# Patient Record
Sex: MALE | Race: WHITE | NOT HISPANIC OR LATINO
[De-identification: names, ages, dates, MRNs, and addresses within clinical notes are randomized per-mention and may not be internally consistent; named-entity substitution may affect disease eponyms.]

---

## 2017-03-30 ENCOUNTER — APPOINTMENT (OUTPATIENT)
Dept: FAMILY MEDICINE | Facility: CLINIC | Age: 67
End: 2017-03-30

## 2017-03-30 VITALS
BODY MASS INDEX: 23.04 KG/M2 | HEIGHT: 72 IN | WEIGHT: 170.13 LBS | SYSTOLIC BLOOD PRESSURE: 130 MMHG | DIASTOLIC BLOOD PRESSURE: 70 MMHG

## 2017-03-30 DIAGNOSIS — Z86.79 PERSONAL HISTORY OF OTHER DISEASES OF THE CIRCULATORY SYSTEM: ICD-10-CM

## 2017-03-30 DIAGNOSIS — Z87.19 PERSONAL HISTORY OF OTHER DISEASES OF THE DIGESTIVE SYSTEM: ICD-10-CM

## 2017-03-30 DIAGNOSIS — Z86.2 PERSONAL HISTORY OF DISEASES OF THE BLOOD AND BLOOD-FORMING ORGANS AND CERTAIN DISORDERS INVOLVING THE IMMUNE MECHANISM: ICD-10-CM

## 2017-03-30 RX ORDER — ASPIRIN 81 MG
81 TABLET, DELAYED RELEASE (ENTERIC COATED) ORAL DAILY
Refills: 0 | Status: ACTIVE | COMMUNITY

## 2017-04-01 LAB
ALBUMIN SERPL ELPH-MCNC: 4.2 G/DL
ALP BLD-CCNC: 54 U/L
ALT SERPL-CCNC: 25 U/L
ANION GAP SERPL CALC-SCNC: 13 MMOL/L
AST SERPL-CCNC: 25 U/L
BILIRUB SERPL-MCNC: 0.7 MG/DL
BUN SERPL-MCNC: 16 MG/DL
CALCIUM SERPL-MCNC: 9.6 MG/DL
CHLORIDE SERPL-SCNC: 102 MMOL/L
CHOLEST SERPL-MCNC: 130 MG/DL
CHOLEST/HDLC SERPL: 2.1 RATIO
CO2 SERPL-SCNC: 24 MMOL/L
CREAT SERPL-MCNC: 0.93 MG/DL
GLUCOSE SERPL-MCNC: 95 MG/DL
HBA1C MFR BLD HPLC: 5.5 %
HDLC SERPL-MCNC: 62 MG/DL
LDLC SERPL CALC-MCNC: 57 MG/DL
POTASSIUM SERPL-SCNC: 5.3 MMOL/L
PROT SERPL-MCNC: 6.8 G/DL
SODIUM SERPL-SCNC: 139 MMOL/L
TRIGL SERPL-MCNC: 53 MG/DL

## 2017-05-02 ENCOUNTER — RX RENEWAL (OUTPATIENT)
Age: 67
End: 2017-05-02

## 2017-05-05 ENCOUNTER — TRANSCRIPTION ENCOUNTER (OUTPATIENT)
Age: 67
End: 2017-05-05

## 2017-05-25 ENCOUNTER — RX RENEWAL (OUTPATIENT)
Age: 67
End: 2017-05-25

## 2017-06-16 ENCOUNTER — RX RENEWAL (OUTPATIENT)
Age: 67
End: 2017-06-16

## 2017-07-25 ENCOUNTER — TRANSCRIPTION ENCOUNTER (OUTPATIENT)
Age: 67
End: 2017-07-25

## 2017-08-10 ENCOUNTER — APPOINTMENT (OUTPATIENT)
Dept: FAMILY MEDICINE | Facility: CLINIC | Age: 67
End: 2017-08-10
Payer: COMMERCIAL

## 2017-08-10 VITALS
DIASTOLIC BLOOD PRESSURE: 62 MMHG | BODY MASS INDEX: 21.26 KG/M2 | HEIGHT: 72 IN | WEIGHT: 157 LBS | SYSTOLIC BLOOD PRESSURE: 104 MMHG

## 2017-08-10 PROCEDURE — 90715 TDAP VACCINE 7 YRS/> IM: CPT

## 2017-08-10 PROCEDURE — 99397 PER PM REEVAL EST PAT 65+ YR: CPT | Mod: 25

## 2017-08-10 PROCEDURE — 90471 IMMUNIZATION ADMIN: CPT

## 2017-11-28 ENCOUNTER — TRANSCRIPTION ENCOUNTER (OUTPATIENT)
Age: 67
End: 2017-11-28

## 2017-12-14 ENCOUNTER — RX RENEWAL (OUTPATIENT)
Age: 67
End: 2017-12-14

## 2017-12-14 ENCOUNTER — TRANSCRIPTION ENCOUNTER (OUTPATIENT)
Age: 67
End: 2017-12-14

## 2018-02-12 ENCOUNTER — APPOINTMENT (OUTPATIENT)
Dept: FAMILY MEDICINE | Facility: CLINIC | Age: 68
End: 2018-02-12
Payer: COMMERCIAL

## 2018-02-12 VITALS
WEIGHT: 154.25 LBS | HEIGHT: 72 IN | BODY MASS INDEX: 20.89 KG/M2 | SYSTOLIC BLOOD PRESSURE: 104 MMHG | HEART RATE: 63 BPM | OXYGEN SATURATION: 99 % | DIASTOLIC BLOOD PRESSURE: 66 MMHG

## 2018-02-12 PROCEDURE — 36415 COLL VENOUS BLD VENIPUNCTURE: CPT

## 2018-02-12 PROCEDURE — 99214 OFFICE O/P EST MOD 30 MIN: CPT | Mod: 25

## 2018-02-13 LAB
ALBUMIN SERPL ELPH-MCNC: 4.5 G/DL
ALP BLD-CCNC: 48 U/L
ALT SERPL-CCNC: 25 U/L
ANION GAP SERPL CALC-SCNC: 13 MMOL/L
AST SERPL-CCNC: 28 U/L
BILIRUB SERPL-MCNC: 0.7 MG/DL
BUN SERPL-MCNC: 17 MG/DL
CALCIUM SERPL-MCNC: 9.9 MG/DL
CHLORIDE SERPL-SCNC: 99 MMOL/L
CHOLEST SERPL-MCNC: 127 MG/DL
CHOLEST/HDLC SERPL: 2.1 RATIO
CO2 SERPL-SCNC: 28 MMOL/L
CREAT SERPL-MCNC: 1.11 MG/DL
GLUCOSE SERPL-MCNC: 92 MG/DL
HBA1C MFR BLD HPLC: 5.4 %
HDLC SERPL-MCNC: 60 MG/DL
LDLC SERPL CALC-MCNC: 55 MG/DL
POTASSIUM SERPL-SCNC: 4.9 MMOL/L
PROT SERPL-MCNC: 7.2 G/DL
SODIUM SERPL-SCNC: 140 MMOL/L
TRIGL SERPL-MCNC: 59 MG/DL

## 2018-05-30 ENCOUNTER — RX RENEWAL (OUTPATIENT)
Age: 68
End: 2018-05-30

## 2018-05-30 ENCOUNTER — TRANSCRIPTION ENCOUNTER (OUTPATIENT)
Age: 68
End: 2018-05-30

## 2018-05-31 ENCOUNTER — TRANSCRIPTION ENCOUNTER (OUTPATIENT)
Age: 68
End: 2018-05-31

## 2018-06-13 ENCOUNTER — TRANSCRIPTION ENCOUNTER (OUTPATIENT)
Age: 68
End: 2018-06-13

## 2018-06-13 ENCOUNTER — RX RENEWAL (OUTPATIENT)
Age: 68
End: 2018-06-13

## 2018-08-29 ENCOUNTER — TRANSCRIPTION ENCOUNTER (OUTPATIENT)
Age: 68
End: 2018-08-29

## 2018-08-29 ENCOUNTER — RX RENEWAL (OUTPATIENT)
Age: 68
End: 2018-08-29

## 2018-09-11 ENCOUNTER — RX RENEWAL (OUTPATIENT)
Age: 68
End: 2018-09-11

## 2018-09-11 ENCOUNTER — TRANSCRIPTION ENCOUNTER (OUTPATIENT)
Age: 68
End: 2018-09-11

## 2018-09-17 ENCOUNTER — APPOINTMENT (OUTPATIENT)
Dept: FAMILY MEDICINE | Facility: CLINIC | Age: 68
End: 2018-09-17
Payer: COMMERCIAL

## 2018-09-17 VITALS
SYSTOLIC BLOOD PRESSURE: 112 MMHG | HEART RATE: 56 BPM | RESPIRATION RATE: 16 BRPM | DIASTOLIC BLOOD PRESSURE: 66 MMHG | HEIGHT: 72 IN | OXYGEN SATURATION: 99 % | TEMPERATURE: 98.5 F | WEIGHT: 151 LBS | BODY MASS INDEX: 20.45 KG/M2

## 2018-09-17 PROCEDURE — 36415 COLL VENOUS BLD VENIPUNCTURE: CPT

## 2018-09-17 PROCEDURE — 99214 OFFICE O/P EST MOD 30 MIN: CPT | Mod: 25

## 2018-09-17 NOTE — HISTORY OF PRESENT ILLNESS
[de-identified] : No further lightheadedness on lower dose of ramipril. No SVT. Has seen cardiology\par \par Having some lower urinary tract symptoms with nocturia and weak stream\par \par Tubular adenoma on colonoscopy 2 years ago told to followup with GI

## 2018-09-17 NOTE — ASSESSMENT
[FreeTextEntry1] : Weak stream, secondary to sphincter hyperplasia. Will give trial of Flomax. Patient warned of hypotension

## 2018-09-17 NOTE — PHYSICAL EXAM
[No Acute Distress] : no acute distress [Well-Appearing] : well-appearing [Regular Rhythm] : with a regular rhythm [No Hernias] : no hernias [Normal Sphincter Tone] : normal sphincter tone [No Mass] : no mass [Penis Abnormality] : normal circumcised penis [Testes Mass (___cm)] : there were no testicular masses [Prostate Enlargement] : the prostate was not enlarged [Prostate Tenderness] : the prostate was not tender [No Prostate Nodules] : no prostate nodules

## 2018-09-18 LAB
ALBUMIN SERPL ELPH-MCNC: 4.6 G/DL
ALP BLD-CCNC: 49 U/L
ALT SERPL-CCNC: 23 U/L
ANION GAP SERPL CALC-SCNC: 12 MMOL/L
AST SERPL-CCNC: 32 U/L
BILIRUB SERPL-MCNC: 0.7 MG/DL
BUN SERPL-MCNC: 16 MG/DL
CALCIUM SERPL-MCNC: 9.4 MG/DL
CHLORIDE SERPL-SCNC: 101 MMOL/L
CHOLEST SERPL-MCNC: 131 MG/DL
CHOLEST/HDLC SERPL: 2 RATIO
CO2 SERPL-SCNC: 26 MMOL/L
CREAT SERPL-MCNC: 0.79 MG/DL
GLUCOSE SERPL-MCNC: 86 MG/DL
HBA1C MFR BLD HPLC: 5.1 %
HDLC SERPL-MCNC: 66 MG/DL
LDLC SERPL CALC-MCNC: 57 MG/DL
POTASSIUM SERPL-SCNC: 4.8 MMOL/L
PROT SERPL-MCNC: 6.9 G/DL
PSA SERPL-MCNC: 1.35 NG/ML
SODIUM SERPL-SCNC: 139 MMOL/L
TRIGL SERPL-MCNC: 41 MG/DL

## 2018-10-30 ENCOUNTER — RX RENEWAL (OUTPATIENT)
Age: 68
End: 2018-10-30

## 2018-10-30 ENCOUNTER — TRANSCRIPTION ENCOUNTER (OUTPATIENT)
Age: 68
End: 2018-10-30

## 2018-12-13 ENCOUNTER — RX CHANGE (OUTPATIENT)
Age: 68
End: 2018-12-13

## 2018-12-13 ENCOUNTER — TRANSCRIPTION ENCOUNTER (OUTPATIENT)
Age: 68
End: 2018-12-13

## 2018-12-18 ENCOUNTER — TRANSCRIPTION ENCOUNTER (OUTPATIENT)
Age: 68
End: 2018-12-18

## 2019-01-29 ENCOUNTER — TRANSCRIPTION ENCOUNTER (OUTPATIENT)
Age: 69
End: 2019-01-29

## 2019-01-29 ENCOUNTER — RX RENEWAL (OUTPATIENT)
Age: 69
End: 2019-01-29

## 2019-02-28 ENCOUNTER — TRANSCRIPTION ENCOUNTER (OUTPATIENT)
Age: 69
End: 2019-02-28

## 2019-02-28 ENCOUNTER — RX RENEWAL (OUTPATIENT)
Age: 69
End: 2019-02-28

## 2019-04-29 ENCOUNTER — TRANSCRIPTION ENCOUNTER (OUTPATIENT)
Age: 69
End: 2019-04-29

## 2019-06-07 ENCOUNTER — TRANSCRIPTION ENCOUNTER (OUTPATIENT)
Age: 69
End: 2019-06-07

## 2019-07-31 ENCOUNTER — TRANSCRIPTION ENCOUNTER (OUTPATIENT)
Age: 69
End: 2019-07-31

## 2019-07-31 ENCOUNTER — RX RENEWAL (OUTPATIENT)
Age: 69
End: 2019-07-31

## 2019-08-27 ENCOUNTER — TRANSCRIPTION ENCOUNTER (OUTPATIENT)
Age: 69
End: 2019-08-27

## 2019-08-27 ENCOUNTER — RX CHANGE (OUTPATIENT)
Age: 69
End: 2019-08-27

## 2019-09-10 ENCOUNTER — TRANSCRIPTION ENCOUNTER (OUTPATIENT)
Age: 69
End: 2019-09-10

## 2019-09-18 ENCOUNTER — APPOINTMENT (OUTPATIENT)
Dept: FAMILY MEDICINE | Facility: CLINIC | Age: 69
End: 2019-09-18
Payer: COMMERCIAL

## 2019-09-18 VITALS
BODY MASS INDEX: 20.88 KG/M2 | RESPIRATION RATE: 16 BRPM | DIASTOLIC BLOOD PRESSURE: 60 MMHG | HEIGHT: 72 IN | SYSTOLIC BLOOD PRESSURE: 116 MMHG | HEART RATE: 58 BPM | WEIGHT: 154.13 LBS | OXYGEN SATURATION: 98 %

## 2019-09-18 LAB
ESTIMATED AVERAGE GLUCOSE: 105 MG/DL
HBA1C MFR BLD HPLC: 5.3 %

## 2019-09-18 PROCEDURE — 99397 PER PM REEVAL EST PAT 65+ YR: CPT | Mod: 25

## 2019-09-18 PROCEDURE — G0444 DEPRESSION SCREEN ANNUAL: CPT

## 2019-09-18 NOTE — END OF VISIT
[FreeTextEntry3] : I, Lowell Martins, acted solely as a scribe for Dr. Travis Lincoln MD on this date 09/18/2019. \par \par All medical records entries made by the Scribe were at my, Dr. Travis Lincoln MD, direction and personally dictated by me on 09/18/2019. I have personally reviewed the chart and agree that the record accurately reflects my personal performance of the history, physical exam, assessment, and plan. I have also personally directed, reviewed, and agreed with the chart.

## 2019-09-18 NOTE — PLAN
[FreeTextEntry1] : \par 1. Continue with medications as outlined above. I renewed his Tamsulosin. \par \par 2. The patient was given a referral to gastroenterology Dr. Ho for reevaluation of his tubular adenoma\par \par 3. The patient was sent for routine blood work. \par \par 4. The new shingles vaccine has been recommended. \par \par 5. Follow up with myself in 6 months for reevaluation.

## 2019-09-18 NOTE — ADDENDUM
[FreeTextEntry1] : I, Lowell Martins, acted solely as a scribe for Dr. Lincoln on this date 09/18/2019.

## 2019-09-18 NOTE — PHYSICAL EXAM
[No Hernias] : no hernias [Penis Abnormality] : normal circumcised penis [Testes Tenderness] : no tenderness of the testes [Testes Mass (___cm)] : there were no testicular masses [Prostate Enlargement] : the prostate was not enlarged [Prostate Tenderness] : the prostate was not tender [Normal] : affect was normal and insight and judgment were intact [No Prostate Nodules] : no prostate nodules

## 2019-09-18 NOTE — HISTORY OF PRESENT ILLNESS
[de-identified] : The patient comes in today for a followup evaluation and reassessment.  \par \par The patient recently saw cardiology and had a negative stress test. His hyperlipidemia and hypertension is well controlled on his current medications. \par \par Overall, the patient states he has been doing fairly well. He reports that he has been taking Flomax with improvement of his urinary symptoms. He endorses increased frequency at night that he attributes to drinking lot of water. He notes inconsistent flow of urine at night but during the day it has been good. He has no new complaints. \par \par He has been brushing his teeth regularly. He has been exercising on a fairly regular basis. No cardiovascular symptoms with exertion. \par \par He is aware that he is due for repeat colonoscopy for tubular adenoma seen in 2016 referred to gastroenterology

## 2019-09-19 LAB
ALBUMIN SERPL ELPH-MCNC: 4.6 G/DL
ALP BLD-CCNC: 52 U/L
ALT SERPL-CCNC: 26 U/L
ANION GAP SERPL CALC-SCNC: 11 MMOL/L
AST SERPL-CCNC: 31 U/L
BILIRUB SERPL-MCNC: 0.6 MG/DL
BUN SERPL-MCNC: 15 MG/DL
CALCIUM SERPL-MCNC: 9.4 MG/DL
CHLORIDE SERPL-SCNC: 103 MMOL/L
CHOLEST SERPL-MCNC: 129 MG/DL
CHOLEST/HDLC SERPL: 2 RATIO
CO2 SERPL-SCNC: 26 MMOL/L
CREAT SERPL-MCNC: 0.91 MG/DL
GLUCOSE SERPL-MCNC: 93 MG/DL
HDLC SERPL-MCNC: 66 MG/DL
LDLC SERPL CALC-MCNC: 53 MG/DL
POTASSIUM SERPL-SCNC: 4.9 MMOL/L
PROT SERPL-MCNC: 6.7 G/DL
SODIUM SERPL-SCNC: 140 MMOL/L
TRIGL SERPL-MCNC: 50 MG/DL

## 2019-10-29 ENCOUNTER — RX RENEWAL (OUTPATIENT)
Age: 69
End: 2019-10-29

## 2019-10-29 ENCOUNTER — TRANSCRIPTION ENCOUNTER (OUTPATIENT)
Age: 69
End: 2019-10-29

## 2019-12-06 ENCOUNTER — TRANSCRIPTION ENCOUNTER (OUTPATIENT)
Age: 69
End: 2019-12-06

## 2020-01-24 ENCOUNTER — TRANSCRIPTION ENCOUNTER (OUTPATIENT)
Age: 70
End: 2020-01-24

## 2020-03-18 ENCOUNTER — APPOINTMENT (OUTPATIENT)
Dept: FAMILY MEDICINE | Facility: CLINIC | Age: 70
End: 2020-03-18
Payer: COMMERCIAL

## 2020-03-18 VITALS
WEIGHT: 154 LBS | HEART RATE: 54 BPM | DIASTOLIC BLOOD PRESSURE: 68 MMHG | HEIGHT: 72 IN | BODY MASS INDEX: 20.86 KG/M2 | SYSTOLIC BLOOD PRESSURE: 126 MMHG | OXYGEN SATURATION: 99 %

## 2020-03-18 PROCEDURE — 99214 OFFICE O/P EST MOD 30 MIN: CPT

## 2020-03-18 RX ORDER — RAMIPRIL 2.5 MG/1
2.5 CAPSULE ORAL DAILY
Qty: 90 | Refills: 0 | Status: DISCONTINUED | COMMUNITY
End: 2020-03-18

## 2020-03-18 NOTE — HISTORY OF PRESENT ILLNESS
[de-identified] : Patient here for follow-up on nonobstructive coronary artery disease.  Recent echocardiogram and stress echo at [facility] were normal.  Patient is very active gardening no chest pain.  He is medically stable for colonoscopy\par \par Lower urinary tract symptoms nocturia x1 or 2 with acceptable stream\par \par History of PSVT has not had an episode in more than a year

## 2020-03-18 NOTE — PHYSICAL EXAM
[Pedal Pulses Present] : the pedal pulses are present [No Edema] : there was no peripheral edema [Normal] : no posterior cervical lymphadenopathy and no anterior cervical lymphadenopathy

## 2020-03-18 NOTE — PLAN
[FreeTextEntry1] : Nonobstructive coronary artery disease remains stable no cardiovascular symptoms\par \par To have colonoscopy for tubular adenoma in the near future\par \par Continue medications for hypertension

## 2020-04-23 ENCOUNTER — TRANSCRIPTION ENCOUNTER (OUTPATIENT)
Age: 70
End: 2020-04-23

## 2020-05-27 ENCOUNTER — TRANSCRIPTION ENCOUNTER (OUTPATIENT)
Age: 70
End: 2020-05-27

## 2020-06-03 ENCOUNTER — TRANSCRIPTION ENCOUNTER (OUTPATIENT)
Age: 70
End: 2020-06-03

## 2020-06-15 ENCOUNTER — TRANSCRIPTION ENCOUNTER (OUTPATIENT)
Age: 70
End: 2020-06-15

## 2020-09-14 ENCOUNTER — APPOINTMENT (OUTPATIENT)
Dept: FAMILY MEDICINE | Facility: CLINIC | Age: 70
End: 2020-09-14
Payer: COMMERCIAL

## 2020-09-14 VITALS
OXYGEN SATURATION: 98 % | DIASTOLIC BLOOD PRESSURE: 60 MMHG | BODY MASS INDEX: 19.23 KG/M2 | WEIGHT: 142 LBS | RESPIRATION RATE: 16 BRPM | HEIGHT: 72 IN | HEART RATE: 48 BPM | SYSTOLIC BLOOD PRESSURE: 110 MMHG

## 2020-09-14 PROCEDURE — 99214 OFFICE O/P EST MOD 30 MIN: CPT | Mod: 25

## 2020-09-14 PROCEDURE — 90662 IIV NO PRSV INCREASED AG IM: CPT

## 2020-09-14 PROCEDURE — G0008: CPT

## 2020-09-14 PROCEDURE — 36415 COLL VENOUS BLD VENIPUNCTURE: CPT

## 2020-09-14 NOTE — HEALTH RISK ASSESSMENT
[] : No [Yes] : Yes [No falls in past year] : Patient reported no falls in the past year [de-identified] : Occasionally [0] : 2) Feeling down, depressed, or hopeless: Not at all (0)

## 2020-09-14 NOTE — PHYSICAL EXAM
[Normal Sphincter Tone] : normal sphincter tone [No Mass] : no mass [Testes Mass (___cm)] : there were no testicular masses [Prostate Enlargement] : the prostate was not enlarged [Penis Abnormality] : normal circumcised penis [No Prostate Nodules] : no prostate nodules [Prostate Tenderness] : the prostate was not tender [No Rash] : no rash [Normal] : no posterior cervical lymphadenopathy and no anterior cervical lymphadenopathy

## 2020-09-14 NOTE — HISTORY OF PRESENT ILLNESS
[FreeTextEntry1] : Follow-up on hyperlipidemia.  Retired head of pharmacy at [employer] [de-identified] : Patient has nonobstructive coronary artery disease his LDL is 53 on atorvastatin 20 he remains very active no cardiovascular symptoms no claudication no paresthesias\par \par Never had colonoscopy due to COVID he will reschedule with Dr. Ho\par \par He has had no episodes of SVT on metoprolol 100 mg.  Told to discuss stopping ramipril with his cardiologist\par \par Lower urinary tract symptoms well controlled with Flomax\par \par Bilateral hip replacements functioning well

## 2020-09-15 LAB
ALBUMIN SERPL ELPH-MCNC: 4.6 G/DL
ALP BLD-CCNC: 66 U/L
ALT SERPL-CCNC: 31 U/L
ANION GAP SERPL CALC-SCNC: 10 MMOL/L
AST SERPL-CCNC: 27 U/L
BILIRUB SERPL-MCNC: 0.8 MG/DL
BUN SERPL-MCNC: 13 MG/DL
CALCIUM SERPL-MCNC: 9.7 MG/DL
CHLORIDE SERPL-SCNC: 101 MMOL/L
CHOLEST SERPL-MCNC: 122 MG/DL
CHOLEST/HDLC SERPL: 1.9 RATIO
CO2 SERPL-SCNC: 28 MMOL/L
CREAT SERPL-MCNC: 0.87 MG/DL
ESTIMATED AVERAGE GLUCOSE: 97 MG/DL
GLUCOSE SERPL-MCNC: 93 MG/DL
HBA1C MFR BLD HPLC: 5 %
HDLC SERPL-MCNC: 63 MG/DL
LDLC SERPL CALC-MCNC: 49 MG/DL
POTASSIUM SERPL-SCNC: 4.7 MMOL/L
PROT SERPL-MCNC: 6.6 G/DL
SARS-COV-2 IGG SERPL IA-ACNC: 0.09 INDEX
SARS-COV-2 IGG SERPL QL IA: NEGATIVE
SODIUM SERPL-SCNC: 140 MMOL/L
TRIGL SERPL-MCNC: 47 MG/DL

## 2020-10-22 ENCOUNTER — TRANSCRIPTION ENCOUNTER (OUTPATIENT)
Age: 70
End: 2020-10-22

## 2020-11-30 ENCOUNTER — TRANSCRIPTION ENCOUNTER (OUTPATIENT)
Age: 70
End: 2020-11-30

## 2021-03-11 ENCOUNTER — TRANSCRIPTION ENCOUNTER (OUTPATIENT)
Age: 71
End: 2021-03-11

## 2021-03-15 ENCOUNTER — APPOINTMENT (OUTPATIENT)
Dept: FAMILY MEDICINE | Facility: CLINIC | Age: 71
End: 2021-03-15
Payer: COMMERCIAL

## 2021-03-15 VITALS
WEIGHT: 151.25 LBS | TEMPERATURE: 97.3 F | SYSTOLIC BLOOD PRESSURE: 112 MMHG | OXYGEN SATURATION: 99 % | BODY MASS INDEX: 20.49 KG/M2 | DIASTOLIC BLOOD PRESSURE: 68 MMHG | HEART RATE: 52 BPM | HEIGHT: 72 IN

## 2021-03-15 PROCEDURE — 96127 BRIEF EMOTIONAL/BEHAV ASSMT: CPT

## 2021-03-15 PROCEDURE — 99214 OFFICE O/P EST MOD 30 MIN: CPT | Mod: 25

## 2021-03-15 PROCEDURE — 99072 ADDL SUPL MATRL&STAF TM PHE: CPT

## 2021-03-15 NOTE — HISTORY OF PRESENT ILLNESS
[FreeTextEntry1] : 6-month checkup [de-identified] : Recently saw cardiology had echocardiogram and carotid sonogram told to continue current medications.  No further episodes of SVT.  He walks 6 miles a day no cardiovascular symptoms\par \par Lower urinary tract symptoms controlled with tamsulosin\par \par Hyperlipidemia and hypertension and nonobstructive coronary artery disease controlled on current medications

## 2021-03-16 LAB
ALBUMIN SERPL ELPH-MCNC: 4.5 G/DL
ALP BLD-CCNC: 80 U/L
ALT SERPL-CCNC: 29 U/L
ANION GAP SERPL CALC-SCNC: 11 MMOL/L
AST SERPL-CCNC: 32 U/L
BASOPHILS # BLD AUTO: 0.02 K/UL
BASOPHILS NFR BLD AUTO: 0.4 %
BILIRUB SERPL-MCNC: 0.5 MG/DL
BUN SERPL-MCNC: 17 MG/DL
CALCIUM SERPL-MCNC: 9.3 MG/DL
CHLORIDE SERPL-SCNC: 102 MMOL/L
CHOLEST SERPL-MCNC: 128 MG/DL
CO2 SERPL-SCNC: 28 MMOL/L
CREAT SERPL-MCNC: 0.91 MG/DL
EOSINOPHIL # BLD AUTO: 0.06 K/UL
EOSINOPHIL NFR BLD AUTO: 1.2 %
ESTIMATED AVERAGE GLUCOSE: 108 MG/DL
GLUCOSE SERPL-MCNC: 94 MG/DL
HBA1C MFR BLD HPLC: 5.4 %
HCT VFR BLD CALC: 43.2 %
HDLC SERPL-MCNC: 61 MG/DL
HGB BLD-MCNC: 13.8 G/DL
IMM GRANULOCYTES NFR BLD AUTO: 0.2 %
LDLC SERPL CALC-MCNC: 57 MG/DL
LYMPHOCYTES # BLD AUTO: 1.34 K/UL
LYMPHOCYTES NFR BLD AUTO: 26.3 %
MAN DIFF?: NORMAL
MCHC RBC-ENTMCNC: 30.3 PG
MCHC RBC-ENTMCNC: 31.9 GM/DL
MCV RBC AUTO: 94.7 FL
MONOCYTES # BLD AUTO: 0.44 K/UL
MONOCYTES NFR BLD AUTO: 8.6 %
NEUTROPHILS # BLD AUTO: 3.23 K/UL
NEUTROPHILS NFR BLD AUTO: 63.3 %
NONHDLC SERPL-MCNC: 67 MG/DL
PLATELET # BLD AUTO: 150 K/UL
POTASSIUM SERPL-SCNC: 4.8 MMOL/L
PROT SERPL-MCNC: 7 G/DL
RBC # BLD: 4.56 M/UL
RBC # FLD: 13.6 %
SODIUM SERPL-SCNC: 141 MMOL/L
TRIGL SERPL-MCNC: 50 MG/DL
WBC # FLD AUTO: 5.1 K/UL

## 2021-05-28 ENCOUNTER — TRANSCRIPTION ENCOUNTER (OUTPATIENT)
Age: 71
End: 2021-05-28

## 2021-09-16 ENCOUNTER — APPOINTMENT (OUTPATIENT)
Dept: FAMILY MEDICINE | Facility: CLINIC | Age: 71
End: 2021-09-16
Payer: COMMERCIAL

## 2021-09-16 VITALS
DIASTOLIC BLOOD PRESSURE: 60 MMHG | OXYGEN SATURATION: 98 % | BODY MASS INDEX: 19.77 KG/M2 | HEIGHT: 72 IN | TEMPERATURE: 97 F | HEART RATE: 58 BPM | WEIGHT: 146 LBS | SYSTOLIC BLOOD PRESSURE: 104 MMHG

## 2021-09-16 DIAGNOSIS — Z23 ENCOUNTER FOR IMMUNIZATION: ICD-10-CM

## 2021-09-16 PROCEDURE — 90662 IIV NO PRSV INCREASED AG IM: CPT

## 2021-09-16 PROCEDURE — G0008: CPT

## 2021-09-16 PROCEDURE — 99397 PER PM REEVAL EST PAT 65+ YR: CPT | Mod: 25

## 2021-09-16 NOTE — PHYSICAL EXAM
[No Edema] : there was no peripheral edema [Normal Sphincter Tone] : normal sphincter tone [Penis Abnormality] : normal circumcised penis [Testes Mass (___cm)] : there were no testicular masses [Prostate Tenderness] : the prostate was not tender [No Prostate Nodules] : no prostate nodules [Normal] : no posterior cervical lymphadenopathy and no anterior cervical lymphadenopathy

## 2021-09-16 NOTE — HISTORY OF PRESENT ILLNESS
[FreeTextEntry1] : Yearly physical [de-identified] : Exercises regularly no cardiovascular symptoms follows up with cardiology regularly.  His cardiologist would like him to continue on ramipril patient rarely has mild orthostatic lightheadedness.  No SVT on beta-blocker\par \par Hyperlipidemia on atorvastatin nonobstructive coronary artery disease\par \par Nocturia x1 or 2 on tamsulosin

## 2021-09-16 NOTE — HEALTH RISK ASSESSMENT
[Excellent] : ~his/her~  mood as  excellent [] : No [No falls in past year] : Patient reported no falls in the past year [0] : 2) Feeling down, depressed, or hopeless: Not at all (0) [Patient reported colonoscopy was normal] : Patient reported colonoscopy was normal [None] : None [With Family] : lives with family [] :  [Fully functional (bathing, dressing, toileting, transferring, walking, feeding)] : Fully functional (bathing, dressing, toileting, transferring, walking, feeding) [Fully functional (using the telephone, shopping, preparing meals, housekeeping, doing laundry, using] : Fully functional and needs no help or supervision to perform IADLs (using the telephone, shopping, preparing meals, housekeeping, doing laundry, using transportation, managing medications and managing finances) [Seat Belt] :  uses seat belt [ColonoscopyDate] : 09/16

## 2021-09-17 LAB
ALBUMIN SERPL ELPH-MCNC: 4.5 G/DL
ALP BLD-CCNC: 65 U/L
ALT SERPL-CCNC: 27 U/L
ANION GAP SERPL CALC-SCNC: 12 MMOL/L
AST SERPL-CCNC: 31 U/L
BILIRUB SERPL-MCNC: 0.7 MG/DL
BUN SERPL-MCNC: 18 MG/DL
CALCIUM SERPL-MCNC: 9.4 MG/DL
CHLORIDE SERPL-SCNC: 103 MMOL/L
CHOLEST SERPL-MCNC: 126 MG/DL
CO2 SERPL-SCNC: 25 MMOL/L
CREAT SERPL-MCNC: 0.99 MG/DL
ESTIMATED AVERAGE GLUCOSE: 108 MG/DL
GLUCOSE SERPL-MCNC: 96 MG/DL
HBA1C MFR BLD HPLC: 5.4 %
HDLC SERPL-MCNC: 64 MG/DL
LDLC SERPL CALC-MCNC: 51 MG/DL
NONHDLC SERPL-MCNC: 63 MG/DL
POTASSIUM SERPL-SCNC: 4.9 MMOL/L
PROT SERPL-MCNC: 6.8 G/DL
SODIUM SERPL-SCNC: 140 MMOL/L
TRIGL SERPL-MCNC: 59 MG/DL

## 2021-10-20 ENCOUNTER — TRANSCRIPTION ENCOUNTER (OUTPATIENT)
Age: 71
End: 2021-10-20

## 2021-11-29 ENCOUNTER — TRANSCRIPTION ENCOUNTER (OUTPATIENT)
Age: 71
End: 2021-11-29

## 2021-12-09 ENCOUNTER — TRANSCRIPTION ENCOUNTER (OUTPATIENT)
Age: 71
End: 2021-12-09

## 2022-04-20 ENCOUNTER — RX RENEWAL (OUTPATIENT)
Age: 72
End: 2022-04-20

## 2022-05-26 ENCOUNTER — TRANSCRIPTION ENCOUNTER (OUTPATIENT)
Age: 72
End: 2022-05-26

## 2022-07-21 ENCOUNTER — APPOINTMENT (OUTPATIENT)
Dept: FAMILY MEDICINE | Facility: CLINIC | Age: 72
End: 2022-07-21

## 2022-07-21 ENCOUNTER — LABORATORY RESULT (OUTPATIENT)
Age: 72
End: 2022-07-21

## 2022-07-21 VITALS
OXYGEN SATURATION: 98 % | TEMPERATURE: 99 F | SYSTOLIC BLOOD PRESSURE: 130 MMHG | HEART RATE: 68 BPM | DIASTOLIC BLOOD PRESSURE: 72 MMHG | BODY MASS INDEX: 19.67 KG/M2 | HEIGHT: 72 IN | WEIGHT: 145.25 LBS

## 2022-07-21 PROCEDURE — 99214 OFFICE O/P EST MOD 30 MIN: CPT

## 2022-07-21 RX ORDER — ATORVASTATIN CALCIUM 20 MG/1
20 TABLET, FILM COATED ORAL DAILY
Qty: 90 | Refills: 3 | Status: DISCONTINUED | COMMUNITY
End: 2022-07-21

## 2022-07-21 NOTE — ASSESSMENT
[FreeTextEntry1] : check covid swab \par check labs \par \par await results\par advised supportive care for now  PROVIDER:[TOKEN:[[id number]]]

## 2022-07-21 NOTE — REVIEW OF SYSTEMS
[Fever] : fever [Chills] : chills [Fatigue] : fatigue [Night Sweats] : night sweats [Cough] : no cough [Nausea] : nausea [Joint Pain] : joint pain [Muscle Pain] : muscle pain [Back Pain] : back pain [Headache] : headache [Negative] : ENT

## 2022-07-21 NOTE — HISTORY OF PRESENT ILLNESS
[FreeTextEntry8] : Pt presenting for fevers, chills, nausea, body aches, fatigue worsening in the past day \par Tmax at home 102, took tylenol this AM \par unable to take motrin\par \par currently on asa+ plavix for stent placement \par \par unsure if covid or viral infection or tick illness

## 2022-07-22 LAB
ALBUMIN SERPL ELPH-MCNC: 4.5 G/DL
ALP BLD-CCNC: 66 U/L
ALT SERPL-CCNC: 27 U/L
ANION GAP SERPL CALC-SCNC: 16 MMOL/L
APPEARANCE: CLEAR
AST SERPL-CCNC: 31 U/L
B BURGDOR AB SER-IMP: NEGATIVE
B BURGDOR IGG+IGM SER QL: 0.03 INDEX
B MICROTI DNA BLD QL NAA+PROBE: NORMAL
BASOPHILS # BLD AUTO: 0.02 K/UL
BASOPHILS NFR BLD AUTO: 0.6 %
BILIRUB SERPL-MCNC: 0.5 MG/DL
BILIRUBIN URINE: NEGATIVE
BLOOD URINE: NEGATIVE
BUN SERPL-MCNC: 13 MG/DL
CALCIUM SERPL-MCNC: 9.3 MG/DL
CHLORIDE SERPL-SCNC: 99 MMOL/L
CO2 SERPL-SCNC: 22 MMOL/L
COLOR: YELLOW
CREAT SERPL-MCNC: 0.9 MG/DL
EGFR: 90 ML/MIN/1.73M2
EOSINOPHIL # BLD AUTO: 0.01 K/UL
EOSINOPHIL NFR BLD AUTO: 0.3 %
GLUCOSE QUALITATIVE U: NEGATIVE
GLUCOSE SERPL-MCNC: 93 MG/DL
HCT VFR BLD CALC: 40.1 %
HGB BLD-MCNC: 13.5 G/DL
IMM GRANULOCYTES NFR BLD AUTO: 0 %
INFLUENZA A RESULT: NOT DETECTED
INFLUENZA B RESULT: NOT DETECTED
KETONES URINE: NORMAL
LEUKOCYTE ESTERASE URINE: NEGATIVE
LYMPHOCYTES # BLD AUTO: 0.73 K/UL
LYMPHOCYTES NFR BLD AUTO: 21.3 %
MAN DIFF?: NORMAL
MCHC RBC-ENTMCNC: 32.5 PG
MCHC RBC-ENTMCNC: 33.7 GM/DL
MCV RBC AUTO: 96.4 FL
MONOCYTES # BLD AUTO: 0.62 K/UL
MONOCYTES NFR BLD AUTO: 18.1 %
NEUTROPHILS # BLD AUTO: 2.04 K/UL
NEUTROPHILS NFR BLD AUTO: 59.7 %
NITRITE URINE: NEGATIVE
PH URINE: 6
PLATELET # BLD AUTO: 107 K/UL
POTASSIUM SERPL-SCNC: 4.7 MMOL/L
PROT SERPL-MCNC: 6.9 G/DL
PROTEIN URINE: ABNORMAL
RBC # BLD: 4.16 M/UL
RBC # FLD: 12.9 %
RESP SYN VIRUS RESULT: NOT DETECTED
SARS-COV-2 RESULT: NOT DETECTED
SODIUM SERPL-SCNC: 138 MMOL/L
SPECIFIC GRAVITY URINE: 1.02
UROBILINOGEN URINE: NORMAL
WBC # FLD AUTO: 3.42 K/UL

## 2022-07-26 LAB
A PHAGO GROEL BLD QL NAA+NON-PROBE: NEGATIVE
E CANIS+EWIN GROEL BLD QL NAA+NON-PROBE: NEGATIVE
E CHAFF GROEL BLD QL NAA+NON-PROBE: NEGATIVE
E MURIS EAUCL GROEL BLD QL NAA+NON-PRB: NEGATIVE

## 2022-09-22 ENCOUNTER — APPOINTMENT (OUTPATIENT)
Dept: FAMILY MEDICINE | Facility: CLINIC | Age: 72
End: 2022-09-22

## 2022-09-22 VITALS
HEIGHT: 72 IN | DIASTOLIC BLOOD PRESSURE: 60 MMHG | BODY MASS INDEX: 19.77 KG/M2 | RESPIRATION RATE: 16 BRPM | OXYGEN SATURATION: 98 % | HEART RATE: 68 BPM | WEIGHT: 146 LBS | SYSTOLIC BLOOD PRESSURE: 110 MMHG | TEMPERATURE: 97.3 F

## 2022-09-22 DIAGNOSIS — Z86.018 PERSONAL HISTORY OF OTHER BENIGN NEOPLASM: ICD-10-CM

## 2022-09-22 DIAGNOSIS — I25.10 ATHEROSCLEROTIC HEART DISEASE OF NATIVE CORONARY ARTERY W/OUT ANGINA PECTORIS: ICD-10-CM

## 2022-09-22 PROCEDURE — G0008: CPT

## 2022-09-22 PROCEDURE — G0439: CPT

## 2022-09-22 PROCEDURE — 90662 IIV NO PRSV INCREASED AG IM: CPT

## 2022-09-22 RX ORDER — AZITHROMYCIN 250 MG/1
250 TABLET, FILM COATED ORAL
Qty: 1 | Refills: 0 | Status: DISCONTINUED | COMMUNITY
Start: 2022-07-22 | End: 2022-09-22

## 2022-09-22 RX ORDER — ROSUVASTATIN CALCIUM 40 MG/1
40 TABLET, FILM COATED ORAL
Refills: 0 | Status: ACTIVE | COMMUNITY

## 2022-09-22 NOTE — ASSESSMENT
[FreeTextEntry1] : Coronary artery disease status post stent patient remains asymptomatic continue current meds check labs fax results to Dr. Plascencia\par \par Flu shot given

## 2022-09-22 NOTE — HISTORY OF PRESENT ILLNESS
[FreeTextEntry1] : Medicare annual [de-identified] : Patient had coronary calcium score of 2000 was asymptomatic however catheterization showed 90% occlusion and patient had 1 stent placed Elis he remains asymptomatic he is going to the gym.  Never smoker drinks 1 glass of wine a day\par \par BPH symptoms controlled with Flomax\par \par Patient has not had any episodes of SVT

## 2022-09-22 NOTE — REVIEW OF SYSTEMS
[Nocturia] : nocturia [Negative] : Gastrointestinal [FreeTextEntry8] : Nocturia x2 with good stream patient drinks before bed [FreeTextEntry9] : Status post bilateral hip replacement successful

## 2022-09-22 NOTE — HEALTH RISK ASSESSMENT
[Excellent] : ~his/her~  mood as  excellent [Never] : Never [Yes] : Yes [No falls in past year] : Patient reported no falls in the past year [PHQ-2 Negative - No further assessment needed] : PHQ-2 Negative - No further assessment needed [de-identified] : 1 glass of wine per day [Patient reported colonoscopy was normal] : Patient reported colonoscopy was normal [Change in mental status noted] : No change in mental status noted [With Family] : lives with family [Reports changes in hearing] : Reports no changes in hearing [Seat Belt] :  uses seat belt [ColonoscopyDate] : 11/20

## 2022-09-22 NOTE — PHYSICAL EXAM
[No Edema] : there was no peripheral edema [No Hernias] : no hernias [Normal Sphincter Tone] : normal sphincter tone [No Mass] : no mass [Penis Abnormality] : normal circumcised penis [Testes Mass (___cm)] : there were no testicular masses [Prostate Tenderness] : the prostate was not tender [No Prostate Nodules] : no prostate nodules [Normal] : no posterior cervical lymphadenopathy and no anterior cervical lymphadenopathy

## 2022-09-23 LAB
ALBUMIN SERPL ELPH-MCNC: 4.5 G/DL
ALP BLD-CCNC: 60 U/L
ALT SERPL-CCNC: 35 U/L
ANION GAP SERPL CALC-SCNC: 11 MMOL/L
AST SERPL-CCNC: 40 U/L
BILIRUB SERPL-MCNC: 0.7 MG/DL
BUN SERPL-MCNC: 12 MG/DL
CALCIUM SERPL-MCNC: 9.5 MG/DL
CHLORIDE SERPL-SCNC: 102 MMOL/L
CHOLEST SERPL-MCNC: 105 MG/DL
CO2 SERPL-SCNC: 25 MMOL/L
CREAT SERPL-MCNC: 0.88 MG/DL
EGFR: 90 ML/MIN/1.73M2
ESTIMATED AVERAGE GLUCOSE: 108 MG/DL
GLUCOSE SERPL-MCNC: 86 MG/DL
HBA1C MFR BLD HPLC: 5.4 %
HDLC SERPL-MCNC: 55 MG/DL
LDLC SERPL CALC-MCNC: 41 MG/DL
NONHDLC SERPL-MCNC: 49 MG/DL
POTASSIUM SERPL-SCNC: 4.9 MMOL/L
PROT SERPL-MCNC: 6.9 G/DL
SODIUM SERPL-SCNC: 139 MMOL/L
TRIGL SERPL-MCNC: 42 MG/DL

## 2022-10-04 ENCOUNTER — RX RENEWAL (OUTPATIENT)
Age: 72
End: 2022-10-04

## 2022-12-06 ENCOUNTER — TRANSCRIPTION ENCOUNTER (OUTPATIENT)
Age: 72
End: 2022-12-06

## 2022-12-07 ENCOUNTER — RX RENEWAL (OUTPATIENT)
Age: 72
End: 2022-12-07

## 2022-12-08 ENCOUNTER — TRANSCRIPTION ENCOUNTER (OUTPATIENT)
Age: 72
End: 2022-12-08

## 2022-12-08 RX ORDER — TAMSULOSIN HYDROCHLORIDE 0.4 MG/1
0.4 CAPSULE ORAL
Qty: 90 | Refills: 3 | Status: DISCONTINUED | COMMUNITY
Start: 1900-01-01 | End: 2022-12-08

## 2023-03-08 ENCOUNTER — TRANSCRIPTION ENCOUNTER (OUTPATIENT)
Age: 73
End: 2023-03-08

## 2023-03-09 ENCOUNTER — TRANSCRIPTION ENCOUNTER (OUTPATIENT)
Age: 73
End: 2023-03-09

## 2023-03-24 ENCOUNTER — APPOINTMENT (OUTPATIENT)
Dept: FAMILY MEDICINE | Facility: CLINIC | Age: 73
End: 2023-03-24
Payer: COMMERCIAL

## 2023-03-24 VITALS
DIASTOLIC BLOOD PRESSURE: 56 MMHG | SYSTOLIC BLOOD PRESSURE: 98 MMHG | HEART RATE: 55 BPM | TEMPERATURE: 97.1 F | WEIGHT: 147.25 LBS | OXYGEN SATURATION: 98 % | BODY MASS INDEX: 19.94 KG/M2 | HEIGHT: 72 IN

## 2023-03-24 DIAGNOSIS — R50.9 FEVER, UNSPECIFIED: ICD-10-CM

## 2023-03-24 DIAGNOSIS — R53.83 OTHER FATIGUE: ICD-10-CM

## 2023-03-24 DIAGNOSIS — Z86.79 PERSONAL HISTORY OF OTHER DISEASES OF THE CIRCULATORY SYSTEM: ICD-10-CM

## 2023-03-24 DIAGNOSIS — Z98.61 CORONARY ANGIOPLASTY STATUS: ICD-10-CM

## 2023-03-24 PROCEDURE — 99214 OFFICE O/P EST MOD 30 MIN: CPT

## 2023-03-24 RX ORDER — RAMIPRIL 2.5 MG/1
2.5 CAPSULE ORAL
Refills: 0 | Status: DISCONTINUED | COMMUNITY
End: 2023-03-24

## 2023-03-24 NOTE — HISTORY OF PRESENT ILLNESS
[FreeTextEntry1] : Follow-up [de-identified] : History of coronary stent for 90% blockage no MI patient followed by Dr. Mahmood.  Patient has low blood pressure denies orthostatic changes I suggest considering stopping ramipril however his cardiologist want him to stay on it needs metoprolol for history of SVT.  He is exercising regularly no cardiovascular symptoms\par \par Lower urinary tract symptoms controlled with tamsulosin nocturia x1 unless he drinks excessive fluids

## 2023-06-01 ENCOUNTER — RX RENEWAL (OUTPATIENT)
Age: 73
End: 2023-06-01

## 2023-09-21 ENCOUNTER — APPOINTMENT (OUTPATIENT)
Dept: FAMILY MEDICINE | Facility: CLINIC | Age: 73
End: 2023-09-21
Payer: COMMERCIAL

## 2023-09-21 VITALS
OXYGEN SATURATION: 98 % | DIASTOLIC BLOOD PRESSURE: 60 MMHG | BODY MASS INDEX: 18.96 KG/M2 | HEART RATE: 55 BPM | WEIGHT: 140 LBS | SYSTOLIC BLOOD PRESSURE: 102 MMHG | HEIGHT: 72 IN | TEMPERATURE: 97.2 F | RESPIRATION RATE: 16 BRPM

## 2023-09-21 DIAGNOSIS — R39.9 UNSPECIFIED SYMPTOMS AND SIGNS INVOLVING THE GENITOURINARY SYSTEM: ICD-10-CM

## 2023-09-21 DIAGNOSIS — E78.5 HYPERLIPIDEMIA, UNSPECIFIED: ICD-10-CM

## 2023-09-21 DIAGNOSIS — Z00.00 ENCOUNTER FOR GENERAL ADULT MEDICAL EXAMINATION W/OUT ABNORMAL FINDINGS: ICD-10-CM

## 2023-09-21 DIAGNOSIS — I47.1 SUPRAVENTRICULAR TACHYCARDIA: ICD-10-CM

## 2023-09-21 PROCEDURE — 90662 IIV NO PRSV INCREASED AG IM: CPT

## 2023-09-21 PROCEDURE — G0008: CPT

## 2023-09-21 PROCEDURE — 99214 OFFICE O/P EST MOD 30 MIN: CPT | Mod: 25

## 2023-09-21 RX ORDER — CLOPIDOGREL BISULFATE 75 MG/1
75 TABLET, FILM COATED ORAL
Refills: 0 | Status: DISCONTINUED | COMMUNITY
End: 2023-09-21

## 2023-09-27 ENCOUNTER — RESULT REVIEW (OUTPATIENT)
Age: 73
End: 2023-09-27

## 2023-09-27 DIAGNOSIS — R79.89 OTHER SPECIFIED ABNORMAL FINDINGS OF BLOOD CHEMISTRY: ICD-10-CM

## 2023-09-27 LAB
ALBUMIN SERPL ELPH-MCNC: 4.3 G/DL
ALP BLD-CCNC: 53 U/L
ALT SERPL-CCNC: 52 U/L
ANION GAP SERPL CALC-SCNC: 10 MMOL/L
AST SERPL-CCNC: 37 U/L
BILIRUB SERPL-MCNC: 0.9 MG/DL
BUN SERPL-MCNC: 17 MG/DL
CALCIUM SERPL-MCNC: 9.3 MG/DL
CHLORIDE SERPL-SCNC: 99 MMOL/L
CHOLEST SERPL-MCNC: 107 MG/DL
CO2 SERPL-SCNC: 26 MMOL/L
CREAT SERPL-MCNC: 0.95 MG/DL
EGFR: 83 ML/MIN/1.73M2
ESTIMATED AVERAGE GLUCOSE: 111 MG/DL
FERRITIN SERPL-MCNC: 31 NG/ML
GLUCOSE SERPL-MCNC: 86 MG/DL
HBA1C MFR BLD HPLC: 5.5 %
HBV SURFACE AG SER QL: NONREACTIVE
HCV AB SER QL: NONREACTIVE
HCV S/CO RATIO: 0.07 S/CO
HDLC SERPL-MCNC: 57 MG/DL
IRON SATN MFR SERPL: 18 %
IRON SERPL-MCNC: 72 UG/DL
LDLC SERPL CALC-MCNC: 37 MG/DL
NONHDLC SERPL-MCNC: 50 MG/DL
POTASSIUM SERPL-SCNC: 4.9 MMOL/L
PROT SERPL-MCNC: 6.4 G/DL
SODIUM SERPL-SCNC: 136 MMOL/L
TIBC SERPL-MCNC: 398 UG/DL
TRIGL SERPL-MCNC: 52 MG/DL
UIBC SERPL-MCNC: 325 UG/DL

## 2023-11-05 LAB
ALBUMIN SERPL ELPH-MCNC: 4.3 G/DL
ALP BLD-CCNC: 59 U/L
ALT SERPL-CCNC: 47 U/L
AST SERPL-CCNC: 50 U/L
BILIRUB DIRECT SERPL-MCNC: 0.2 MG/DL
BILIRUB INDIRECT SERPL-MCNC: 0.4 MG/DL
BILIRUB SERPL-MCNC: 0.6 MG/DL
PROT SERPL-MCNC: 6.4 G/DL

## 2023-11-21 ENCOUNTER — APPOINTMENT (OUTPATIENT)
Dept: ULTRASOUND IMAGING | Facility: CLINIC | Age: 73
End: 2023-11-21
Payer: COMMERCIAL

## 2023-11-21 ENCOUNTER — OUTPATIENT (OUTPATIENT)
Dept: OUTPATIENT SERVICES | Facility: HOSPITAL | Age: 73
LOS: 1 days | End: 2023-11-21
Payer: COMMERCIAL

## 2023-11-21 DIAGNOSIS — R79.89 OTHER SPECIFIED ABNORMAL FINDINGS OF BLOOD CHEMISTRY: ICD-10-CM

## 2023-11-21 PROCEDURE — 76705 ECHO EXAM OF ABDOMEN: CPT

## 2023-11-21 PROCEDURE — 76705 ECHO EXAM OF ABDOMEN: CPT | Mod: 26

## 2024-02-13 ENCOUNTER — RX RENEWAL (OUTPATIENT)
Age: 74
End: 2024-02-13

## 2024-02-14 ENCOUNTER — APPOINTMENT (OUTPATIENT)
Dept: FAMILY MEDICINE | Facility: CLINIC | Age: 74
End: 2024-02-14
Payer: COMMERCIAL

## 2024-02-14 VITALS
HEART RATE: 54 BPM | DIASTOLIC BLOOD PRESSURE: 60 MMHG | BODY MASS INDEX: 19.64 KG/M2 | SYSTOLIC BLOOD PRESSURE: 100 MMHG | OXYGEN SATURATION: 100 % | TEMPERATURE: 98.1 F | HEIGHT: 72 IN | WEIGHT: 145 LBS

## 2024-02-14 DIAGNOSIS — J01.90 ACUTE SINUSITIS, UNSPECIFIED: ICD-10-CM

## 2024-02-14 DIAGNOSIS — Z13.31 ENCOUNTER FOR SCREENING FOR DEPRESSION: ICD-10-CM

## 2024-02-14 PROCEDURE — 99213 OFFICE O/P EST LOW 20 MIN: CPT

## 2024-02-14 PROCEDURE — 96127 BRIEF EMOTIONAL/BEHAV ASSMT: CPT

## 2024-02-14 RX ORDER — AMOXICILLIN AND CLAVULANATE POTASSIUM 875; 125 MG/1; MG/1
875-125 TABLET, COATED ORAL
Qty: 14 | Refills: 0 | Status: ACTIVE | COMMUNITY
Start: 2024-02-14 | End: 1900-01-01

## 2024-02-14 NOTE — HISTORY OF PRESENT ILLNESS
[FreeTextEntry8] :  73 year old male presents c/o sinus congestion and pressure x 2 weeks. He reports associated green mucous production. His left ear feels clogged at times. No fever or chills. No cough. He has been rinsing his sinuses which helps. He has noticed a combination of green mucous with dry blood after irrigating his sinuses.

## 2024-02-14 NOTE — HEALTH RISK ASSESSMENT
[0] : 2) Feeling down, depressed, or hopeless: Not at all (0) [PHQ-2 Negative - No further assessment needed] : PHQ-2 Negative - No further assessment needed [[id number]] : 0 [Never] : Never

## 2024-02-14 NOTE — PHYSICAL EXAM
[No Acute Distress] : no acute distress [Well Nourished] : well nourished [Well Developed] : well developed [Normal Oropharynx] : the oropharynx was normal [Normal TMs] : both tympanic membranes were normal [Normal Appearance] : was normal in appearance [Neck Supple] : was supple [No Respiratory Distress] : no respiratory distress  [Clear to Auscultation] : lungs were clear to auscultation bilaterally [Normal Rate] : normal rate  [Regular Rhythm] : with a regular rhythm [Normal S1, S2] : normal S1 and S2 [No Murmur] : no murmur heard [Normal Anterior Cervical Nodes] : no anterior cervical lymphadenopathy [Alert and Oriented x3] : oriented to person, place, and time [de-identified] : Postnasal drip present

## 2024-02-14 NOTE — ASSESSMENT
[FreeTextEntry1] : take Augmentin as directed with food c/w sinus rinses if no improvement- advised to see ENT

## 2024-03-18 ENCOUNTER — RX RENEWAL (OUTPATIENT)
Age: 74
End: 2024-03-18

## 2024-05-13 ENCOUNTER — RX RENEWAL (OUTPATIENT)
Age: 74
End: 2024-05-13

## 2024-05-14 RX ORDER — METOPROLOL SUCCINATE 100 MG/1
100 TABLET, EXTENDED RELEASE ORAL
Qty: 90 | Refills: 3 | Status: ACTIVE | COMMUNITY
Start: 2022-04-20 | End: 1900-01-01

## 2024-06-03 ENCOUNTER — TRANSCRIPTION ENCOUNTER (OUTPATIENT)
Age: 74
End: 2024-06-03

## 2024-06-03 RX ORDER — TAMSULOSIN HYDROCHLORIDE 0.4 MG/1
0.4 CAPSULE ORAL
Qty: 90 | Refills: 2 | Status: ACTIVE | COMMUNITY
Start: 2018-09-17 | End: 1900-01-01

## 2024-06-06 ENCOUNTER — TRANSCRIPTION ENCOUNTER (OUTPATIENT)
Age: 74
End: 2024-06-06

## 2024-06-06 RX ORDER — EZETIMIBE 10 MG/1
10 TABLET ORAL
Qty: 90 | Refills: 3 | Status: ACTIVE | COMMUNITY
Start: 1900-01-01 | End: 1900-01-01

## 2024-08-20 ENCOUNTER — TRANSCRIPTION ENCOUNTER (OUTPATIENT)
Age: 74
End: 2024-08-20

## 2024-09-02 ENCOUNTER — NON-APPOINTMENT (OUTPATIENT)
Age: 74
End: 2024-09-02

## 2024-09-04 ENCOUNTER — LABORATORY RESULT (OUTPATIENT)
Age: 74
End: 2024-09-04

## 2024-09-04 ENCOUNTER — APPOINTMENT (OUTPATIENT)
Dept: FAMILY MEDICINE | Facility: CLINIC | Age: 74
End: 2024-09-04
Payer: COMMERCIAL

## 2024-09-04 VITALS
TEMPERATURE: 98.3 F | BODY MASS INDEX: 18.96 KG/M2 | DIASTOLIC BLOOD PRESSURE: 68 MMHG | SYSTOLIC BLOOD PRESSURE: 130 MMHG | WEIGHT: 140 LBS | HEIGHT: 72 IN | HEART RATE: 65 BPM | OXYGEN SATURATION: 99 %

## 2024-09-04 DIAGNOSIS — Z00.00 ENCOUNTER FOR GENERAL ADULT MEDICAL EXAMINATION W/OUT ABNORMAL FINDINGS: ICD-10-CM

## 2024-09-04 DIAGNOSIS — I47.10 SUPRAVENTRICULAR TACHYCARDIA, UNSPECIFIED: ICD-10-CM

## 2024-09-04 DIAGNOSIS — R79.89 OTHER SPECIFIED ABNORMAL FINDINGS OF BLOOD CHEMISTRY: ICD-10-CM

## 2024-09-04 DIAGNOSIS — E78.5 HYPERLIPIDEMIA, UNSPECIFIED: ICD-10-CM

## 2024-09-04 DIAGNOSIS — Z98.61 CORONARY ANGIOPLASTY STATUS: ICD-10-CM

## 2024-09-04 PROCEDURE — 99397 PER PM REEVAL EST PAT 65+ YR: CPT

## 2024-09-04 NOTE — PHYSICAL EXAM
[Pedal Pulses Present] : the pedal pulses are present [No Edema] : there was no peripheral edema [Normal Sphincter Tone] : normal sphincter tone [No Mass] : no mass [Penis Abnormality] : normal circumcised penis [Testes Mass (___cm)] : there were no testicular masses [Prostate Tenderness] : the prostate was not tender [No Prostate Nodules] : no prostate nodules [Normal] : affect was normal and insight and judgment were intact [de-identified] : Nasal mucosa normal [de-identified] : 1/6 systolic ejection murmur apex [de-identified] : Slight inguinal bulge bilaterally

## 2024-09-04 NOTE — HISTORY OF PRESENT ILLNESS
[FreeTextEntry1] : Yearly checkup [de-identified] : Patient has no complaints he goes to the gym regularly no cardiovascular symptoms followed by [facility] status post drug-eluting stent recent stress test negative mild plaque carotid arteries on Zetia and rosuvastatin mild LFT elevations have resolved  Lower urinary tract symptoms controlled with tamsulosin  Currently has some allergic rhinitis will use Zyrtec

## 2024-09-04 NOTE — ASSESSMENT
[FreeTextEntry1] : Continue cardiac and urinary meds.  Patient will delay flu shot.  Plans on getting RSV and COVID booster

## 2024-09-04 NOTE — REVIEW OF SYSTEMS
[Nocturia] : nocturia [Negative] : Musculoskeletal [FreeTextEntry7] : Good stream with Flomax [FreeTextEntry8] : Drinks fluid before bed

## 2024-09-04 NOTE — HEALTH RISK ASSESSMENT
[Excellent] : ~his/her~  mood as  excellent [No falls in past year] : Patient reported no falls in the past year [PHQ-2 Negative - No further assessment needed] : PHQ-2 Negative - No further assessment needed [Never] : Never [Patient reported colonoscopy was normal] : Patient reported colonoscopy was normal [Change in mental status noted] : No change in mental status noted [With Family] : lives with family [Fully functional (bathing, dressing, toileting, transferring, walking, feeding)] : Fully functional (bathing, dressing, toileting, transferring, walking, feeding) [Fully functional (using the telephone, shopping, preparing meals, housekeeping, doing laundry, using] : Fully functional and needs no help or supervision to perform IADLs (using the telephone, shopping, preparing meals, housekeeping, doing laundry, using transportation, managing medications and managing finances) [Seat Belt] :  uses seat belt [ColonoscopyDate] : 11/20

## 2024-09-06 LAB
ALBUMIN SERPL ELPH-MCNC: 4.3 G/DL
ALP BLD-CCNC: 65 U/L
ALT SERPL-CCNC: 57 U/L
ANION GAP SERPL CALC-SCNC: 11 MMOL/L
AST SERPL-CCNC: 68 U/L
BASOPHILS # BLD AUTO: 0.01 K/UL
BASOPHILS NFR BLD AUTO: 0.3 %
BILIRUB SERPL-MCNC: 0.8 MG/DL
BUN SERPL-MCNC: 11 MG/DL
CALCIUM SERPL-MCNC: 9.1 MG/DL
CHLORIDE SERPL-SCNC: 100 MMOL/L
CHOLEST SERPL-MCNC: 98 MG/DL
CO2 SERPL-SCNC: 26 MMOL/L
CREAT SERPL-MCNC: 0.91 MG/DL
EGFR: 87 ML/MIN/1.73M2
EOSINOPHIL # BLD AUTO: 0.01 K/UL
EOSINOPHIL NFR BLD AUTO: 0.3 %
ESTIMATED AVERAGE GLUCOSE: 105 MG/DL
GLUCOSE SERPL-MCNC: 90 MG/DL
HBA1C MFR BLD HPLC: 5.3 %
HCT VFR BLD CALC: 37.9 %
HDLC SERPL-MCNC: 47 MG/DL
HGB BLD-MCNC: 11.9 G/DL
IMM GRANULOCYTES NFR BLD AUTO: 0.3 %
LDLC SERPL CALC-MCNC: 38 MG/DL
LYMPHOCYTES # BLD AUTO: 0.62 K/UL
LYMPHOCYTES NFR BLD AUTO: 16.4 %
MAN DIFF?: NORMAL
MCHC RBC-ENTMCNC: 30.8 PG
MCHC RBC-ENTMCNC: 31.4 GM/DL
MCV RBC AUTO: 98.2 FL
MONOCYTES # BLD AUTO: 0.88 K/UL
MONOCYTES NFR BLD AUTO: 23.2 %
NEUTROPHILS # BLD AUTO: 2.26 K/UL
NEUTROPHILS NFR BLD AUTO: 59.5 %
NONHDLC SERPL-MCNC: 51 MG/DL
PLATELET # BLD AUTO: 112 K/UL
POTASSIUM SERPL-SCNC: 4.8 MMOL/L
PROT SERPL-MCNC: 6.9 G/DL
PSA SERPL-MCNC: 1.28 NG/ML
RBC # BLD: 3.86 M/UL
RBC # FLD: 13.7 %
SODIUM SERPL-SCNC: 137 MMOL/L
TRIGL SERPL-MCNC: 54 MG/DL
WBC # FLD AUTO: 3.79 K/UL

## 2024-09-16 DIAGNOSIS — R50.9 FEVER, UNSPECIFIED: ICD-10-CM

## 2024-09-16 DIAGNOSIS — I25.10 ATHEROSCLEROTIC HEART DISEASE OF NATIVE CORONARY ARTERY W/OUT ANGINA PECTORIS: ICD-10-CM

## 2024-09-16 DIAGNOSIS — I10 ESSENTIAL (PRIMARY) HYPERTENSION: ICD-10-CM

## 2024-09-16 DIAGNOSIS — T84.059A PERIPROSTHETIC OSTEOLYSIS OF UNSPECIFIED INTERNAL PROSTHETIC JOINT, INITIAL ENCOUNTER: ICD-10-CM

## 2024-09-16 DIAGNOSIS — T84.039A MECHANICAL LOOSENING OF UNSPECIFIED INTERNAL PROSTHETIC JOINT, INITIAL ENCOUNTER: ICD-10-CM

## 2024-10-13 ENCOUNTER — OUTPATIENT (OUTPATIENT)
Dept: OUTPATIENT SERVICES | Facility: HOSPITAL | Age: 74
LOS: 1 days | Discharge: HOME | End: 2024-10-13

## 2024-10-13 DIAGNOSIS — D64.9 ANEMIA, UNSPECIFIED: ICD-10-CM

## 2024-10-15 ENCOUNTER — RESULT REVIEW (OUTPATIENT)
Age: 74
End: 2024-10-15

## 2024-10-15 ENCOUNTER — APPOINTMENT (OUTPATIENT)
Dept: HEMATOLOGY ONCOLOGY | Facility: CLINIC | Age: 74
End: 2024-10-15
Payer: COMMERCIAL

## 2024-10-15 VITALS
BODY MASS INDEX: 19.77 KG/M2 | DIASTOLIC BLOOD PRESSURE: 62 MMHG | SYSTOLIC BLOOD PRESSURE: 133 MMHG | OXYGEN SATURATION: 100 % | HEIGHT: 72 IN | HEART RATE: 52 BPM | WEIGHT: 146 LBS | TEMPERATURE: 97.6 F

## 2024-10-15 DIAGNOSIS — D69.6 THROMBOCYTOPENIA, UNSPECIFIED: ICD-10-CM

## 2024-10-15 DIAGNOSIS — Z80.0 FAMILY HISTORY OF MALIGNANT NEOPLASM OF DIGESTIVE ORGANS: ICD-10-CM

## 2024-10-15 DIAGNOSIS — D64.9 ANEMIA, UNSPECIFIED: ICD-10-CM

## 2024-10-15 DIAGNOSIS — I25.10 ATHEROSCLEROTIC HEART DISEASE OF NATIVE CORONARY ARTERY W/OUT ANGINA PECTORIS: ICD-10-CM

## 2024-10-15 DIAGNOSIS — Z78.9 OTHER SPECIFIED HEALTH STATUS: ICD-10-CM

## 2024-10-15 DIAGNOSIS — D72.819 DECREASED WHITE BLOOD CELL COUNT, UNSPECIFIED: ICD-10-CM

## 2024-10-15 DIAGNOSIS — Z52.008 UNSPECIFIED DONOR, OTHER BLOOD: ICD-10-CM

## 2024-10-15 LAB
BASOPHILS # BLD AUTO: 0.03 K/UL — SIGNIFICANT CHANGE UP (ref 0–0.2)
BASOPHILS NFR BLD AUTO: 0.7 % — SIGNIFICANT CHANGE UP (ref 0–2)
EOSINOPHIL # BLD AUTO: 0.07 K/UL — SIGNIFICANT CHANGE UP (ref 0–0.5)
EOSINOPHIL NFR BLD AUTO: 1.7 % — SIGNIFICANT CHANGE UP (ref 0–6)
HCT VFR BLD CALC: 34.6 % — LOW (ref 39–50)
HGB BLD-MCNC: 11.5 G/DL — LOW (ref 13–17)
IMM GRANULOCYTES NFR BLD AUTO: 0.2 % — SIGNIFICANT CHANGE UP (ref 0–0.9)
LG PLATELETS BLD QL AUTO: SLIGHT — SIGNIFICANT CHANGE UP
LYMPHOCYTES # BLD AUTO: 0.84 K/UL — LOW (ref 1–3.3)
LYMPHOCYTES # BLD AUTO: 19.9 % — SIGNIFICANT CHANGE UP (ref 13–44)
MCHC RBC-ENTMCNC: 31.6 PG — SIGNIFICANT CHANGE UP (ref 27–34)
MCHC RBC-ENTMCNC: 33.2 GM/DL — SIGNIFICANT CHANGE UP (ref 32–36)
MCV RBC AUTO: 95.1 FL — SIGNIFICANT CHANGE UP (ref 80–100)
MONOCYTES # BLD AUTO: 0.67 K/UL — SIGNIFICANT CHANGE UP (ref 0–0.9)
MONOCYTES NFR BLD AUTO: 15.8 % — HIGH (ref 2–14)
NEUTROPHILS # BLD AUTO: 2.61 K/UL — SIGNIFICANT CHANGE UP (ref 1.8–7.4)
NEUTROPHILS NFR BLD AUTO: 61.7 % — SIGNIFICANT CHANGE UP (ref 43–77)
NRBC # BLD: 0 /100 WBCS — SIGNIFICANT CHANGE UP (ref 0–0)
NRBC BLD-RTO: 0 /100 WBCS — SIGNIFICANT CHANGE UP (ref 0–0)
PLAT MORPH BLD: NORMAL — SIGNIFICANT CHANGE UP
PLATELET # BLD AUTO: 175 K/UL — SIGNIFICANT CHANGE UP (ref 150–400)
RBC # BLD: 3.64 M/UL — LOW (ref 4.2–5.8)
RBC # FLD: 13.3 % — SIGNIFICANT CHANGE UP (ref 10.3–14.5)
RBC BLD AUTO: SIGNIFICANT CHANGE UP
WBC # BLD: 4.23 K/UL — SIGNIFICANT CHANGE UP (ref 3.8–10.5)
WBC # FLD AUTO: 4.23 K/UL — SIGNIFICANT CHANGE UP (ref 3.8–10.5)

## 2024-10-15 PROCEDURE — 99205 OFFICE O/P NEW HI 60 MIN: CPT

## 2024-10-15 PROCEDURE — G2211 COMPLEX E/M VISIT ADD ON: CPT

## 2024-10-16 DIAGNOSIS — D72.819 DECREASED WHITE BLOOD CELL COUNT, UNSPECIFIED: ICD-10-CM

## 2024-10-16 DIAGNOSIS — D69.6 THROMBOCYTOPENIA, UNSPECIFIED: ICD-10-CM

## 2024-10-16 PROBLEM — Z78.9 SOCIAL ALCOHOL USE: Status: ACTIVE | Noted: 2024-10-11

## 2024-10-16 PROBLEM — Z80.0 FAMILY HISTORY OF MALIGNANT NEOPLASM OF STOMACH: Status: ACTIVE | Noted: 2024-10-16

## 2024-10-16 PROBLEM — I25.10 CAD (CORONARY ARTERY DISEASE): Status: ACTIVE | Noted: 2024-10-16

## 2024-10-16 PROBLEM — I25.10 NONOBSTRUCTIVE ATHEROSCLEROSIS OF CORONARY ARTERY: Status: RESOLVED | Noted: 2018-02-12 | Resolved: 2024-10-16

## 2024-10-16 PROBLEM — D64.9 ANEMIA, UNSPECIFIED TYPE: Status: ACTIVE | Noted: 2024-10-11

## 2024-10-16 PROBLEM — Z52.008 BLOOD DONOR: Status: ACTIVE | Noted: 2024-10-16

## 2024-10-16 LAB
ANA SER IF-ACNC: NEGATIVE
FERRITIN SERPL-MCNC: 21 NG/ML
FOLATE SERPL-MCNC: 14.4 NG/ML
IRON SATN MFR SERPL: 8 %
IRON SERPL-MCNC: 35 UG/DL
LDH SERPL-CCNC: 192 U/L
TIBC SERPL-MCNC: 456 UG/DL
UIBC SERPL-MCNC: 421 UG/DL
VIT B12 SERPL-MCNC: 320 PG/ML

## 2024-10-17 ENCOUNTER — NON-APPOINTMENT (OUTPATIENT)
Age: 74
End: 2024-10-17

## 2024-10-18 LAB
ALBUMIN MFR SERPL ELPH: 59.3 %
ALBUMIN SERPL-MCNC: 4 G/DL
ALBUMIN/GLOB SERPL: 1.5 RATIO
ALPHA1 GLOB MFR SERPL ELPH: 4.2 %
ALPHA1 GLOB SERPL ELPH-MCNC: 0.3 G/DL
ALPHA2 GLOB MFR SERPL ELPH: 9.2 %
ALPHA2 GLOB SERPL ELPH-MCNC: 0.6 G/DL
B-GLOBULIN MFR SERPL ELPH: 14.5 %
B-GLOBULIN SERPL ELPH-MCNC: 1 G/DL
DEPRECATED KAPPA LC FREE/LAMBDA SER: 1.55 RATIO
GAMMA GLOB FLD ELPH-MCNC: 0.9 G/DL
GAMMA GLOB MFR SERPL ELPH: 12.8 %
IGA SER QL IEP: 352 MG/DL
IGG SER QL IEP: 804 MG/DL
IGM SER QL IEP: 42 MG/DL
INTERPRETATION SERPL IEP-IMP: NORMAL
KAPPA LC CSF-MCNC: 1.72 MG/DL
KAPPA LC SERPL-MCNC: 2.67 MG/DL
M PROTEIN SPEC IFE-MCNC: NORMAL
PROT SERPL-MCNC: 6.7 G/DL
PROT SERPL-MCNC: 6.7 G/DL

## 2024-11-13 ENCOUNTER — NON-APPOINTMENT (OUTPATIENT)
Age: 74
End: 2024-11-13

## 2024-11-14 ENCOUNTER — APPOINTMENT (OUTPATIENT)
Dept: RHEUMATOLOGY | Facility: CLINIC | Age: 74
End: 2024-11-14
Payer: COMMERCIAL

## 2024-11-14 VITALS
BODY MASS INDEX: 19.64 KG/M2 | WEIGHT: 145 LBS | TEMPERATURE: 96.4 F | HEART RATE: 48 BPM | HEIGHT: 72 IN | DIASTOLIC BLOOD PRESSURE: 62 MMHG | OXYGEN SATURATION: 99 % | SYSTOLIC BLOOD PRESSURE: 128 MMHG

## 2024-11-14 DIAGNOSIS — D72.819 DECREASED WHITE BLOOD CELL COUNT, UNSPECIFIED: ICD-10-CM

## 2024-11-14 DIAGNOSIS — D64.9 ANEMIA, UNSPECIFIED: ICD-10-CM

## 2024-11-14 PROCEDURE — 36415 COLL VENOUS BLD VENIPUNCTURE: CPT

## 2024-11-14 PROCEDURE — G2211 COMPLEX E/M VISIT ADD ON: CPT | Mod: NC

## 2024-11-14 PROCEDURE — 99205 OFFICE O/P NEW HI 60 MIN: CPT

## 2024-11-14 RX ORDER — ASCORBIC ACID 500 MG
500 TABLET ORAL
Refills: 0 | Status: ACTIVE | COMMUNITY

## 2024-11-14 RX ORDER — DOCUSATE SODIUM 100 MG/1
100 CAPSULE, LIQUID FILLED ORAL
Refills: 0 | Status: ACTIVE | COMMUNITY

## 2024-11-14 RX ORDER — FERROUS SULFATE 325(65) MG
325 (65 FE) TABLET ORAL
Refills: 0 | Status: ACTIVE | COMMUNITY

## 2024-11-18 ENCOUNTER — NON-APPOINTMENT (OUTPATIENT)
Age: 74
End: 2024-11-18

## 2024-11-18 LAB
25(OH)D3 SERPL-MCNC: 35.9 NG/ML
ALBUMIN SERPL ELPH-MCNC: 4.6 G/DL
ALP BLD-CCNC: 73 U/L
ALT SERPL-CCNC: 56 U/L
ANION GAP SERPL CALC-SCNC: 14 MMOL/L
AST SERPL-CCNC: 58 U/L
BILIRUB SERPL-MCNC: 0.4 MG/DL
BUN SERPL-MCNC: 14 MG/DL
CALCIUM SERPL-MCNC: 10 MG/DL
CHLORIDE SERPL-SCNC: 100 MMOL/L
CO2 SERPL-SCNC: 26 MMOL/L
CREAT SERPL-MCNC: 0.86 MG/DL
CRP SERPL-MCNC: <3 MG/L
EGFR: 90 ML/MIN/1.73M2
ENA RNP AB SER IA-ACNC: <0.2 AL
ENA SM AB SER IA-ACNC: <0.2 AL
ENA SS-A AB SER IA-ACNC: <0.2 AL
ENA SS-B AB SER IA-ACNC: <0.2 AL
ERYTHROCYTE [SEDIMENTATION RATE] IN BLOOD BY WESTERGREN METHOD: 4 MM/HR
GLUCOSE SERPL-MCNC: 104 MG/DL
POTASSIUM SERPL-SCNC: 4.8 MMOL/L
PROT SERPL-MCNC: 7.1 G/DL
RHEUMATOID FACT SER QL: <10 IU/ML
SODIUM SERPL-SCNC: 139 MMOL/L

## 2024-11-27 ENCOUNTER — RX RENEWAL (OUTPATIENT)
Age: 74
End: 2024-11-27

## 2025-02-11 ENCOUNTER — TRANSCRIPTION ENCOUNTER (OUTPATIENT)
Age: 75
End: 2025-02-11

## 2025-02-13 ENCOUNTER — NON-APPOINTMENT (OUTPATIENT)
Age: 75
End: 2025-02-13

## 2025-02-13 ENCOUNTER — APPOINTMENT (OUTPATIENT)
Dept: ORTHOPEDIC SURGERY | Facility: CLINIC | Age: 75
End: 2025-02-13
Payer: COMMERCIAL

## 2025-02-13 DIAGNOSIS — M25.551 PAIN IN RIGHT HIP: ICD-10-CM

## 2025-02-13 DIAGNOSIS — Z96.643 PRESENCE OF ARTIFICIAL HIP JOINT, BILATERAL: ICD-10-CM

## 2025-02-13 DIAGNOSIS — M25.552 PAIN IN LEFT HIP: ICD-10-CM

## 2025-02-13 DIAGNOSIS — M70.62 TROCHANTERIC BURSITIS, LEFT HIP: ICD-10-CM

## 2025-02-13 PROCEDURE — 73522 X-RAY EXAM HIPS BI 3-4 VIEWS: CPT

## 2025-02-13 PROCEDURE — 20610 DRAIN/INJ JOINT/BURSA W/O US: CPT | Mod: LT

## 2025-02-13 PROCEDURE — 99204 OFFICE O/P NEW MOD 45 MIN: CPT | Mod: 25

## 2025-02-13 RX ORDER — MELOXICAM 15 MG/1
15 TABLET ORAL
Qty: 30 | Refills: 1 | Status: ACTIVE | COMMUNITY
Start: 2025-02-13 | End: 1900-01-01

## 2025-02-14 ENCOUNTER — RX RENEWAL (OUTPATIENT)
Age: 75
End: 2025-02-14

## 2025-04-08 ENCOUNTER — NON-APPOINTMENT (OUTPATIENT)
Age: 75
End: 2025-04-08